# Patient Record
Sex: FEMALE | Race: WHITE
[De-identification: names, ages, dates, MRNs, and addresses within clinical notes are randomized per-mention and may not be internally consistent; named-entity substitution may affect disease eponyms.]

---

## 2018-05-06 ENCOUNTER — HOSPITAL ENCOUNTER (EMERGENCY)
Dept: HOSPITAL 52 - LL.ED | Age: 83
Discharge: NURSING, REHAB OR LONG-TERM CARE | End: 2018-05-06
Payer: MEDICARE

## 2018-05-06 VITALS — DIASTOLIC BLOOD PRESSURE: 60 MMHG | SYSTOLIC BLOOD PRESSURE: 120 MMHG

## 2018-05-06 DIAGNOSIS — Z79.82: ICD-10-CM

## 2018-05-06 DIAGNOSIS — Z91.040: ICD-10-CM
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
## 2018-05-06 DIAGNOSIS — Z88.5: ICD-10-CM
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
## 2018-05-06 DIAGNOSIS — F03.90: ICD-10-CM

## 2018-05-06 DIAGNOSIS — I10: ICD-10-CM

## 2018-05-06 DIAGNOSIS — Z88.2: ICD-10-CM

## 2018-05-06 DIAGNOSIS — E03.9: ICD-10-CM

## 2018-05-06 DIAGNOSIS — Z79.899: ICD-10-CM

## 2018-05-06 DIAGNOSIS — Z88.0: ICD-10-CM

## 2018-05-06 DIAGNOSIS — R79.89: ICD-10-CM

## 2018-05-06 DIAGNOSIS — Z88.1: ICD-10-CM

## 2018-05-06 DIAGNOSIS — S01.21XA: Primary | ICD-10-CM

## 2018-05-06 LAB
CHLORIDE SERPL-SCNC: 102 MMOL/L (ref 98–107)
SODIUM SERPL-SCNC: 141 MMOL/L (ref 136–145)

## 2018-05-06 RX ADMIN — NEOMYCIN AND POLYMYXIN B SULFATES AND BACITRACIN ZINC ONE EACH: 400; 3.5; 5 OINTMENT TOPICAL at 10:16

## 2018-05-06 NOTE — EDM.PDOC
ED HPI GENERAL MEDICAL PROBLEM





- General


Chief Complaint: General


Stated Complaint: Fall without LOC


Time Seen by Provider: 05/06/18 08:35


Source of Information: Reports: Other (nursing home staff/EMS)


History Limitations: Reports: Altered Mental Status (dementia)





- History of Present Illness


INITIAL COMMENTS - FREE TEXT/NARRATIVE: 





Patient sent here for evaluation after falling in room.  Has history of falls/

syncope. BP noted to be low at time of incident today.  Hit head on a shelf.  

No specifically known LOC. Was complaining of some head discomfort on scene.  

Also complained of some back discomfort but has chronic back pain of this same 

nature per NH staff. Hx of dementia.  Is pleasant, talking with staff.  One 

member familiar with patient thought that patient's speech might be a bit 

slurred compared to normal, stroke code called.  History of dementia. 





- Related Data


 Allergies











Allergy/AdvReac Type Severity Reaction Status Date / Time


 


adhesive tape Allergy  Irritabilit Verified 11/12/16 14:36





   y  


 


azithromycin Allergy  Cannot Verified 11/12/16 14:36





   Remember  


 


Latex, Natural Rubber Allergy  Irritabilit Verified 11/12/16 14:36





   y  


 


levofloxacin [From Levaquin] Allergy  Cannot Verified 11/12/16 14:36





   Remember  


 


lisinopril Allergy  Cannot Verified 11/12/16 14:33





   Remember  


 


morphine Allergy  Cannot Verified 11/12/16 14:33





   Remember  


 


nitrofurantoin Allergy  Cannot Verified 11/12/16 14:36





[From Macrodantin]   Remember  


 


Penicillins Allergy  Cannot Verified 11/12/16 14:36





   Remember  


 


Sulfa (Sulfonamide Allergy  Cannot Verified 11/12/16 14:36





Antibiotics)   Remember  


 


cleaning products Allergy  Cannot Uncoded 11/12/16 14:33





   Remember  











Home Meds: 


 Home Meds





Aspirin [Halfprin] 81 mg PO DAILY@1800 12/20/14 [History]


Calcium Carbonate/Vitamin D3 [Calcium 600-Vit D3 400 Tablet] 400 - 600 mg PO 

BID 12/20/14 [History]


Cyanocobalamin (Vitamin B12) [Vitamin B12] 1,000 mcg IM Q30D 12/20/14 [History]


Donepezil [Aricept] 10 mg PO BEDTIME 12/20/14 [History]


Ferrous Sulfate 325 mg PO DAILY 12/20/14 [History]


Levothyroxine [Synthroid] 75 mcg PO DAILY 12/20/14 [History]


Multivitamin W/Iron, Minerals [Theratrum Complete 50 Plus] 1 tab PO DAILY 12/20/ 14 [History]


Simvastatin 20 mg PO BEDTIME 12/20/14 [History]


amLODIPine Besylate [Amlodipine Besylate] 10 mg PO DAILY 12/20/14 [History]


Nitroglycerin [Nitrostat] 0.4 mg SL Q5M PRN 06/04/15 [History]


Ascorbic Acid [Vitamin C] 250 mg PO DAILY@18 10/23/16 [History]


Cetirizine [ZyrTEC] 10 mg PO DAILY 10/23/16 [History]


Light Mineral Oil/Min Oil/Pf [Retaine Mgd Eye Drops] 1 each OP Q12H 10/23/16 [

History]


Sennosides [Senna] 8.6 mg PO DAILY 10/23/16 [History]


traMADol HCl [Ultram] 50 mg PO Q12H 10/23/16 [History]


Acetaminophen [Tylenol] 650 mg PO Q4HR PRN 10/27/16 [History]


Clindamycin HCl [Cleocin] 4 cap PO ASDIRECTED PRN 10/27/16 [History]


Memantine [Namenda] 10 mg PO BID 05/06/18 [History]


Omega-3 Fatty Acids/Fish Oil [Fish Oil 1,000 mg Softgel] 2 cap PO DAILY 05/06/ 18 [History]


Omeprazole Magnesium [Prilosec Otc] 20 mg PO DAILY 05/06/18 [History]


PARoxetine [Paxil] 5 mg PO DAILY 05/06/18 [History]


Potassium Chloride 10 meq PO DAILY 05/06/18 [History]


Trolamine Salicylate [Arthricreme] 1 applic TOP Q12HR PRN 05/06/18 [History]











Past Medical History


Cardiovascular History: Reports: Arrhythmia, Hypertension


Other Cardiovascular History: Cardiac Dysrhythemia.  Cardiac Valvular Disease


Musculoskeletal History: Reports: Arthritis, Back Pain, Chronic, Osteoarthritis

, Other (See Below) (severe degenerative changes noted on neck CT)


Neurological History: Reports: Alzheimers Disease, Migraines


Psychiatric History: Reports: Alzheimers Disease


Endocrine/Metabolic History: Reports: Hypothyroidism





- Past Surgical History


Musculoskeletal Surgical History: Reports: Other (See Below)





Social & Family History





- Tobacco Use


Smoking Status *Q: Never Smoker


Second Hand Smoke Exposure: No





- Caffeine Use


Caffeine Use: Reports: Coffee





- Alcohol Use


Days Per Week of Alcohol Use: 0





- Recreational Drug Use


Recreational Drug Use: No





ED ROS GENERAL





- Review of Systems


Review Of Systems: ROS reveals no pertinent complaints other than HPI. (limited 

by patient's dementia)


HEENT: Reports: Nose Pain.  Denies: Dental Pain, Eye Pain


Respiratory: Reports: No Symptoms.  Denies: Shortness of Breath


Cardiovascular: Reports: No Symptoms


GI/Abdominal: Reports: No Symptoms


: Reports: No Symptoms


Musculoskeletal: Reports: Other (left sided discomfort lower rib margin 

laterally)


Skin: Reports: Wound (small nasal laceration/contusion right side head)


Neurological: Reports: Dizziness (earlier at nursing home).  Denies: Headache





ED EXAM, GENERAL





- Physical Exam


Exam: See Below


Exam Limited By: No Limitations


General Appearance: Alert, WD/WN, No Apparent Distress


Eye Exam: Bilateral Eye: EOMI, PERRL


Ears: Normal External Exam, Normal Canal, Hearing Grossly Normal, Normal TMs


Nose: Normal Mucosa, Nasal Tenderness (mild), Nasal Swelling (mild, over 

bridge.  Septum appears intact).  No: Nasal Deformity


Throat/Mouth: Normal Inspection, Normal Lips, Normal Teeth, Normal Oropharynx, 

Normal Voice, No Airway Compromise


Head: Other (mild abrasion without swelling noted right lateral forehead/temple)


Neck: Normal Inspection, Supple, Non-Tender, Full Range of Motion


Respiratory/Chest: No Respiratory Distress, Lungs Clear, Normal Breath Sounds, 

No Accessory Muscle Use, Other (very mild discomfort with palpation of left 

side of chest at lower rib margin.  No swelling/bruising/crepitus/erythema)


Cardiovascular: Regular Rate, Rhythm, No Edema, Systolic Murmur


Peripheral Pulses: 2+: Radial (L), Radial (R), Dorsalis Pedis (L), Dorsalis 

Pedis (R)


GI/Abdominal: Normal Bowel Sounds, Soft, Non-Tender, No Distention


 (Female) Exam: Deferred


Rectal (Female) Exam: Deferred


Back Exam: No: CVA Tenderness (L), CVA Tenderness (R), Muscle Spasm, Paraspinal 

Tenderness, Vertebral Tenderness


Extremities: Normal Range of Motion (for age), Non-Tender, Normal Capillary 

Refill


Neurological: Alert, CN II-XII Intact, No Motor/Sensory Deficits, Memory Loss 

Remote Events, Memory Loss Recent Events


Psychiatric: Normal Affect, Normal Mood


Skin Exam: Warm, Dry, Other (1cm superficial laceration over bridge of nose, 

mild abrasion right temple and above upper lip).  No: Ecchymosis





ED GENERAL MEDICAL PROCEDURES





- Laceration/Wound Repair


  ** Nose


Lac/wound length in cm: 1


Appearance: Superficial, Clean


Skin Prep: Isopropyl Alcohol (Alcohol)


Exploration/Debridement/Repair: Wound Explored, In a Bloodless Field, Explored 

to Base, No Foreign Material Found


Closed with: Dermabond


Sterile Dressing Applied: None


Complications: No





EKG INTERPRETATION


EKG Date: 05/06/18


Time: 08:44


Rhythm: NSR


Rate (Beats/Min): 64


Axis: Normal


P-Wave: Present


QRS: Normal


ST-T: Other (Flattening of T waves observed in lateral leads)


QT: Normal


Comparison: NA - No Prior EKG





Course





- Orders/Labs/Meds


Orders: 





 Active Orders 24 hr











 Category Date Time Status


 


 Blood Glucose Check, Bedside [RC] ONETIME Care  05/06/18 08:18 Active


 


 EKG Documentation Completion [RC] ASDIRECTED Care  05/06/18 08:29 Active


 


 C-Spine [Cervical Spine wo Cont] [CT] Stat Exams  05/06/18 08:21 Taken


 


 Head wo Cont [CT] Stat Exams  05/06/18 08:21 Taken


 


 Soft Tissue Neck wo Cont [CT] Stat Exams  05/06/18 08:21 Stop Req


 


 PROLACTIN [REF] Stat Lab  05/06/18 08:33 Received











Labs: 





 Laboratory Tests











  05/06/18 05/06/18 05/06/18 Range/Units





  08:33 08:33 08:33 


 


WBC  10.2    (4.0-10.2)  K/uL


 


RBC  4.11    (3.77-5.09)  M/uL


 


Hgb  12.5    (11.7-15.5)  g/dL


 


Hct  38.0    (34.0-46.0)  %


 


MCV  92.5    (84.0-98.0)  fL


 


MCH  30.4    (28.2-33.3)  pg


 


MCHC  32.9    (31.7-36.0)  g/dL


 


RDW  13.0    (11.2-14.1)  %


 


Plt Count  242    (150-350)  K/uL


 


Neut % (Auto)  69.5    (45.0-80.0)  %


 


Lymph % (Auto)  16.4    (10.0-50.0)  %


 


Mono % (Auto)  11.5    (2.0-14.0)  %


 


Eos % (Auto)  2.4    (0.0-5.0)  %


 


Baso % (Auto)  0.2    (0.0-2.0)  %


 


Neut # (Auto)  7.10 H    (1.40-7.00)  K/uL


 


Lymph # (Auto)  1.68    (0.50-3.50)  K/uL


 


Mono # (Auto)  1.18 H    (0.00-1.00)  K/uL


 


Eos # (Auto)  0.25    (0.00-0.50)  K/uL


 


Baso # (Auto)  0.02    (0.00-0.20)  K/uL


 


PT   10.7   (9.8-11.7)  SEC


 


INR   1.0   


 


APTT     (22.1-29.8)  SEC


 


D-Dimer, Quantitative     (0-400)  ng/mL


 


Sodium    141  (136-145)  mmol/L


 


Potassium    3.5  (3.5-5.1)  mmol/L


 


Chloride    102  ()  mmol/L


 


Carbon Dioxide    27.0  (21.0-32.0)  mmol/L


 


BUN    17  (7-18)  mg/dL


 


Creatinine    0.94  (0.51-1.17)  mg/dL


 


Est Cr Clr Drug Dosing    31.43  mL/min


 


Estimated GFR (MDRD)    57  mL/min


 


Glucose    127 H  ()  mg/dL


 


Calcium    9.4  (8.5-10.1)  mg/dL


 


Magnesium    1.7 L  (1.8-2.4)  mg/dL


 


Total Bilirubin    0.4  (0.2-1.0)  mg/dL


 


AST    22  (15-37)  U/L


 


ALT    21  (12-78)  U/L


 


Alkaline Phosphatase    99  ()  IU/L


 


Creatine Kinase    92  ()  U/L


 


Creatine Kinase Index    1.8  (0.0-2.5)  %


 


CK-MB (CK-2)    1.70  (0.00-3.60)  ng/mL


 


Troponin I    0.000  (0.000-0.056)  ng/mL


 


NT-Pro-B Natriuret Pep    357 H  (0-125)  pg/mL


 


Total Protein    7.8  (6.4-8.2)  g/dL


 


Albumin    3.6  (3.4-5.0)  g/dL














  05/06/18 05/06/18 Range/Units





  08:33 08:33 


 


WBC    (4.0-10.2)  K/uL


 


RBC    (3.77-5.09)  M/uL


 


Hgb    (11.7-15.5)  g/dL


 


Hct    (34.0-46.0)  %


 


MCV    (84.0-98.0)  fL


 


MCH    (28.2-33.3)  pg


 


MCHC    (31.7-36.0)  g/dL


 


RDW    (11.2-14.1)  %


 


Plt Count    (150-350)  K/uL


 


Neut % (Auto)    (45.0-80.0)  %


 


Lymph % (Auto)    (10.0-50.0)  %


 


Mono % (Auto)    (2.0-14.0)  %


 


Eos % (Auto)    (0.0-5.0)  %


 


Baso % (Auto)    (0.0-2.0)  %


 


Neut # (Auto)    (1.40-7.00)  K/uL


 


Lymph # (Auto)    (0.50-3.50)  K/uL


 


Mono # (Auto)    (0.00-1.00)  K/uL


 


Eos # (Auto)    (0.00-0.50)  K/uL


 


Baso # (Auto)    (0.00-0.20)  K/uL


 


PT    (9.8-11.7)  SEC


 


INR    


 


APTT   25.3  (22.1-29.8)  SEC


 


D-Dimer, Quantitative  1120 H   (0-400)  ng/mL


 


Sodium    (136-145)  mmol/L


 


Potassium    (3.5-5.1)  mmol/L


 


Chloride    ()  mmol/L


 


Carbon Dioxide    (21.0-32.0)  mmol/L


 


BUN    (7-18)  mg/dL


 


Creatinine    (0.51-1.17)  mg/dL


 


Est Cr Clr Drug Dosing    mL/min


 


Estimated GFR (MDRD)    mL/min


 


Glucose    ()  mg/dL


 


Calcium    (8.5-10.1)  mg/dL


 


Magnesium    (1.8-2.4)  mg/dL


 


Total Bilirubin    (0.2-1.0)  mg/dL


 


AST    (15-37)  U/L


 


ALT    (12-78)  U/L


 


Alkaline Phosphatase    ()  IU/L


 


Creatine Kinase    ()  U/L


 


Creatine Kinase Index    (0.0-2.5)  %


 


CK-MB (CK-2)    (0.00-3.60)  ng/mL


 


Troponin I    (0.000-0.056)  ng/mL


 


NT-Pro-B Natriuret Pep    (0-125)  pg/mL


 


Total Protein    (6.4-8.2)  g/dL


 


Albumin    (3.4-5.0)  g/dL














- Radiology Interpretation


CT Results Time: 09:17 (No acute changes noted to head/neck. Has generalized 

atrophy/age related vascular changes.  Chronic arthritis/bony changes to 

cervical spine. )





- Re-Assessments/Exams


Free Text/Narrative Re-Assessment/Exam: 





05/06/18 09:57


Patient rested comfortably while workup being performed.  If asked if she had 

any pain, she would complain of discomfort in the lateral mid left back/side.  

No signs of trauma to this area and this is chronic pain per NH report earlier. 

No other focal tenderness/signs of injury noted on trunk/arms/legs.





Labs unremarkable except for elevated DDimer. Patient denies SOB .  RR 12-15 

with O2 sats 94-96%.  Suspect elevation is likely from another cause.  CT scan 

not indicated at this time but should be considered if patient is noted to have 

respiratory changes suggestive of possible PE. 





Laceration on nose closed with tissue glue. 





Plan at this time is to have patient return to [facility] with further follow up 

as needed.  


Free Text/Narrative Re-Assessment/Exam: 





05/06/18 10:10


Able to ambulate safely using walker. Orthostatics unremarkable. 





Departure





- Departure


Time of Disposition: 10:10


Disposition: DC/Tfer to SNF [facility]


Condition: Good


Clinical Impression: 


 Elevated d-dimer





Fall


Qualifiers:


 Encounter type: initial encounter Qualified Code(s): W19.XXXA - Unspecified 

fall, initial encounter





Laceration of nose


Qualifiers:


 Encounter type: initial encounter Qualified Code(s): S01.21XA - Laceration 

without foreign body of nose, initial encounter





Head contusion


Qualifiers:


 Encounter type: initial encounter Contusion of head detail: unspecified part 

of head Qualified Code(s): S00.93XA - Contusion of unspecified part of head, 

initial encounter








- Discharge Information


Referrals: 


Kandace Suresh MD [Primary Care Provider] - 


Additional Instructions: 


Neuro checks every 4 hours while awake for the next 24 hours.  Follow up as 

needed if acute changes are observed. 





Recommend BP checks daily prior to BP medication administration to observe for 

BP trends.  If BP is low, may need to adjust medication. Low BP may have 

contributed to patient's fall this morning.   Hold BP meds if Blood pressure is 

below 120/80. 





DDimer was elevated today but no complaint of shortness of breath, normal 

respiratory rate, O2 sats 95%.  Suspect due to patient's CHF/other causes. 

However, if increased SOB is noted, follow up with ' clinic to see if 

scan should be scheduled. 





Laceration closed with tissue glue.  Avoid getting this area wet for the next 5 

days to allow wound to heal. 





Follow up with [facility] on nursing home rounds next week to see how patient is doing/

BP trends. Follow up otherwise as needed if acute problems develop. 





- My Orders


Last 24 Hours: 





My Active Orders





05/06/18 08:18


Blood Glucose Check, Bedside [RC] ONETIME 





05/06/18 08:21


C-Spine [Cervical Spine wo Cont] [CT] Stat 


Head wo Cont [CT] Stat 


Soft Tissue Neck wo Cont [CT] Stat 





05/06/18 08:29


EKG Documentation Completion [RC] ASDIRECTED 





05/06/18 08:33


PROLACTIN [REF] Stat 














- Assessment/Plan


Last 24 Hours: 





My Active Orders





05/06/18 08:18


Blood Glucose Check, Bedside [RC] ONETIME 





05/06/18 08:21


C-Spine [Cervical Spine wo Cont] [CT] Stat 


Head wo Cont [CT] Stat 


Soft Tissue Neck wo Cont [CT] Stat 





05/06/18 08:29


EKG Documentation Completion [RC] ASDIRECTED 





05/06/18 08:33


PROLACTIN [REF] Stat